# Patient Record
Sex: MALE | Race: WHITE | NOT HISPANIC OR LATINO | Employment: OTHER | URBAN - METROPOLITAN AREA
[De-identification: names, ages, dates, MRNs, and addresses within clinical notes are randomized per-mention and may not be internally consistent; named-entity substitution may affect disease eponyms.]

---

## 2022-05-23 ENCOUNTER — OFFICE VISIT (OUTPATIENT)
Dept: FAMILY MEDICINE | Facility: CLINIC | Age: 42
End: 2022-05-23
Payer: MEDICAID

## 2022-05-23 VITALS
DIASTOLIC BLOOD PRESSURE: 84 MMHG | HEIGHT: 71 IN | BODY MASS INDEX: 21.7 KG/M2 | HEART RATE: 72 BPM | OXYGEN SATURATION: 100 % | WEIGHT: 155 LBS | SYSTOLIC BLOOD PRESSURE: 132 MMHG

## 2022-05-23 DIAGNOSIS — L50.9 URTICARIA: ICD-10-CM

## 2022-05-23 DIAGNOSIS — R10.31 BILATERAL GROIN PAIN: ICD-10-CM

## 2022-05-23 DIAGNOSIS — R10.32 BILATERAL GROIN PAIN: ICD-10-CM

## 2022-05-23 DIAGNOSIS — Z13.31 POSITIVE DEPRESSION SCREENING: ICD-10-CM

## 2022-05-23 DIAGNOSIS — Z00.00 ROUTINE PHYSICAL EXAMINATION: Primary | ICD-10-CM

## 2022-05-23 PROCEDURE — 99204 OFFICE O/P NEW MOD 45 MIN: CPT | Mod: S$GLB,,, | Performed by: PHYSICIAN ASSISTANT

## 2022-05-23 PROCEDURE — 3008F BODY MASS INDEX DOCD: CPT | Mod: CPTII,S$GLB,, | Performed by: PHYSICIAN ASSISTANT

## 2022-05-23 PROCEDURE — 3008F PR BODY MASS INDEX (BMI) DOCUMENTED: ICD-10-PCS | Mod: CPTII,S$GLB,, | Performed by: PHYSICIAN ASSISTANT

## 2022-05-23 PROCEDURE — 1159F MED LIST DOCD IN RCRD: CPT | Mod: CPTII,S$GLB,, | Performed by: PHYSICIAN ASSISTANT

## 2022-05-23 PROCEDURE — 99204 PR OFFICE/OUTPT VISIT, NEW, LEVL IV, 45-59 MIN: ICD-10-PCS | Mod: S$GLB,,, | Performed by: PHYSICIAN ASSISTANT

## 2022-05-23 PROCEDURE — 1159F PR MEDICATION LIST DOCUMENTED IN MEDICAL RECORD: ICD-10-PCS | Mod: CPTII,S$GLB,, | Performed by: PHYSICIAN ASSISTANT

## 2022-05-23 RX ORDER — DIPHENHYDRAMINE HCL 25 MG
25 CAPSULE ORAL EVERY 6 HOURS PRN
COMMUNITY
End: 2022-07-29

## 2022-05-23 NOTE — PROGRESS NOTES
I have used clinical judgement based on duration and functional status to consider definite necessity for treatment.

## 2022-05-23 NOTE — PROGRESS NOTES
SUBJECTIVE:    Patient ID: Josh Rogers is a 42 y.o. male.    Chief Complaint: Establish Care (New patient to establish care//possible bilateral lower side hernias//hair loss//left shoulder pain//patient suspects he has MRSA//tc)    This is a 42-year-old male who presents today to establish care.  His past medical history, past surgical history and family history have been taken and reviewed in the chart with the patient.  I do not see where he has had any recent blood workup.  He has not seen a PCP in quite some time.  Current medications are reflected.  He reports having been on lexapro and trazodone in the past but didn't like the way it made him feel. Saw a physician in [place], [place] but unsure of his name. Has a yard business on the side. Has not been able to hold a job recently. Reports distant hx of learning disability. Was never successful in school. 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 Non-smoker for the past 15 years. Main concern today is in regard to hernias present. Has felt bulging present when lifting and straining. Difficulty handling a weed eater sometimes. Concerns also for MRSA colonization. Has noticed recurrent skin infections. Thinks that he passes on to his wife. Finds more prevalent when he perspires. He is concerned about hair loss. Shaved his head recently and waiting for growth back. Appears in a male pattern type loss. He describes an urticarial ttype rash here recently that may have been brought on by an acute stress reaction. Wife was talking about splitting up. Trying to work through things now.      Admission on 05/12/2022, Discharged on 05/12/2022   Component Date Value Ref Range Status    POC Rapid COVID 05/12/2022 Negative  Negative Final     Acceptable 05/12/2022 Yes   Final    POC Molecular Influenza A Ag 05/12/2022 Negative  Negative, Not Reported Final    POC Molecular Influenza B Ag 05/12/2022 Negative  Negative, Not Reported Final     Acceptable  "05/12/2022 Yes   Final       Past Medical History:   Diagnosis Date    Allergy     Depression      No past surgical history on file.  Family History   Problem Relation Age of Onset    Alcohol abuse Father     Lung cancer Maternal Grandfather        Marital Status:   Alcohol History:  reports previous alcohol use.  Tobacco History:  reports that he has quit smoking. He does not have any smokeless tobacco history on file.  Drug History:  reports previous drug use. Drug: Marijuana.    Review of patient's allergies indicates:   Allergen Reactions    Stress Rash       Current Outpatient Medications:     diphenhydrAMINE (BENADRYL) 25 mg capsule, Take 25 mg by mouth every 6 (six) hours as needed for Itching., Disp: , Rfl:     ibuprofen (ADVIL,MOTRIN) 600 MG tablet, Take 1 tablet (600 mg total) by mouth every 6 (six) hours as needed for Pain. (Patient not taking: Reported on 5/23/2022), Disp: 20 tablet, Rfl: 0    Review of Systems   Constitutional: Negative for activity change, fatigue, fever and unexpected weight change.   HENT: Negative for congestion.    Respiratory: Negative for apnea, cough, chest tightness and shortness of breath.    Cardiovascular: Negative for chest pain and palpitations.   Gastrointestinal: Negative for abdominal distention and abdominal pain.   Genitourinary: Negative for difficulty urinating and dysuria.   Musculoskeletal: Negative for arthralgias and back pain.   Skin: Negative for rash.   Neurological: Negative for dizziness and weakness.          Objective:      Vitals:    05/23/22 1406   BP: 132/84   Pulse: 72   SpO2: 100%   Weight: 70.3 kg (155 lb)   Height: 5' 11" (1.803 m)     Physical Exam  Constitutional:       General: He is not in acute distress.     Appearance: He is well-developed.   HENT:      Head: Normocephalic and atraumatic.   Eyes:      Pupils: Pupils are equal, round, and reactive to light.   Neck:      Thyroid: No thyromegaly.   Cardiovascular:      Rate and " Rhythm: Normal rate and regular rhythm.      Heart sounds: Normal heart sounds.   Pulmonary:      Effort: Pulmonary effort is normal.      Breath sounds: Normal breath sounds.   Abdominal:      General: Bowel sounds are normal. There is no distension.      Palpations: Abdomen is soft.      Tenderness: There is no abdominal tenderness.      Hernia: There is no hernia in the left inguinal area or right inguinal area.   Genitourinary:     Penis: Circumcised.       Testes: Normal.         Right: Mass not present.   Musculoskeletal:         General: Normal range of motion.      Cervical back: Normal range of motion and neck supple.   Skin:     General: Skin is warm and dry.      Findings: No erythema or rash.   Neurological:      Mental Status: He is alert and oriented to person, place, and time.      Cranial Nerves: No cranial nerve deficit.           Assessment:       1. Routine physical examination    2. Positive depression screening    3. Bilateral groin pain    4. Urticaria         Plan:       Routine physical examination  Comments:  Overall, pretty healthy individual. Will get labs for ongoing pt care, f/u in 6-8 weeks.  Orders:  -     CBC Auto Differential; Future; Expected date: 05/23/2022  -     Comprehensive Metabolic Panel; Future; Expected date: 05/23/2022  -     Lipid Panel; Future; Expected date: 05/23/2022  -     TSH w/reflex to FT4; Future; Expected date: 05/23/2022  -     Urinalysis, Reflex to Urine Culture Urine, Clean Catch  -     HIV 1/2 Ag/Ab (4th Gen); Future; Expected date: 05/23/2022  -     Hepatitis C Antibody; Future; Expected date: 05/23/2022    Positive depression screening  Comments:  I have reviewed the positive depression score which warrants active treatment with psychotherapy and/or medications.    Bilateral groin pain  Comments:  unappreciable on exam. will check ultrasound for further evaluation. Possibly MSK in nature  Orders:  -     US Pelvis Limited Non OB; Future; Expected date:  05/23/2022    Urticaria  Comments:  seems like it could be stress related. not present now. discussed having antihistamine on board if he needs. May take BID.      Follow up in about 8 weeks (around 7/18/2022).        5/23/2022 Manuel Cavazos PA-C

## 2022-06-01 ENCOUNTER — HOSPITAL ENCOUNTER (OUTPATIENT)
Dept: RADIOLOGY | Facility: HOSPITAL | Age: 42
Discharge: HOME | End: 2022-06-01
Attending: PHYSICIAN ASSISTANT
Payer: MEDICAID

## 2022-06-01 DIAGNOSIS — R10.32 BILATERAL GROIN PAIN: ICD-10-CM

## 2022-06-01 DIAGNOSIS — R10.31 BILATERAL GROIN PAIN: ICD-10-CM

## 2022-06-01 LAB
ALBUMIN SERPL-MCNC: 4.8 G/DL (ref 3.6–5.1)
ALBUMIN/GLOB SERPL: 2 (CALC) (ref 1–2.5)
ALP SERPL-CCNC: 72 U/L (ref 36–130)
ALT SERPL-CCNC: 17 U/L (ref 9–46)
AST SERPL-CCNC: 15 U/L (ref 10–40)
BASOPHILS # BLD AUTO: 39 CELLS/UL (ref 0–200)
BASOPHILS NFR BLD AUTO: 0.8 %
BILIRUB SERPL-MCNC: 0.5 MG/DL (ref 0.2–1.2)
BUN SERPL-MCNC: 21 MG/DL (ref 7–25)
BUN/CREAT SERPL: NORMAL (CALC) (ref 6–22)
CALCIUM SERPL-MCNC: 10.3 MG/DL (ref 8.6–10.3)
CHLORIDE SERPL-SCNC: 105 MMOL/L (ref 98–110)
CHOLEST SERPL-MCNC: 173 MG/DL
CHOLEST/HDLC SERPL: 4.2 (CALC)
CO2 SERPL-SCNC: 30 MMOL/L (ref 20–32)
CREAT SERPL-MCNC: 1.09 MG/DL (ref 0.6–1.35)
EOSINOPHIL # BLD AUTO: 240 CELLS/UL (ref 15–500)
EOSINOPHIL NFR BLD AUTO: 4.9 %
ERYTHROCYTE [DISTWIDTH] IN BLOOD BY AUTOMATED COUNT: 12.2 % (ref 11–15)
GLOBULIN SER CALC-MCNC: 2.4 G/DL (CALC) (ref 1.9–3.7)
GLUCOSE SERPL-MCNC: 96 MG/DL (ref 65–99)
HCT VFR BLD AUTO: 45.8 % (ref 38.5–50)
HCV AB S/CO SERPL IA: 0.02
HCV AB SERPL QL IA: NORMAL
HDLC SERPL-MCNC: 41 MG/DL
HGB BLD-MCNC: 15.5 G/DL (ref 13.2–17.1)
HIV 1+2 AB+HIV1 P24 AG SERPL QL IA: NORMAL
LDLC SERPL CALC-MCNC: 107 MG/DL (CALC)
LYMPHOCYTES # BLD AUTO: 1872 CELLS/UL (ref 850–3900)
LYMPHOCYTES NFR BLD AUTO: 38.2 %
MCH RBC QN AUTO: 30.3 PG (ref 27–33)
MCHC RBC AUTO-ENTMCNC: 33.8 G/DL (ref 32–36)
MCV RBC AUTO: 89.6 FL (ref 80–100)
MONOCYTES # BLD AUTO: 348 CELLS/UL (ref 200–950)
MONOCYTES NFR BLD AUTO: 7.1 %
NEUTROPHILS # BLD AUTO: 2401 CELLS/UL (ref 1500–7800)
NEUTROPHILS NFR BLD AUTO: 49 %
NONHDLC SERPL-MCNC: 132 MG/DL (CALC)
PLATELET # BLD AUTO: 313 THOUSAND/UL (ref 140–400)
PMV BLD REES-ECKER: 10.8 FL (ref 7.5–12.5)
POTASSIUM SERPL-SCNC: 4.5 MMOL/L (ref 3.5–5.3)
PROT SERPL-MCNC: 7.2 G/DL (ref 6.1–8.1)
RBC # BLD AUTO: 5.11 MILLION/UL (ref 4.2–5.8)
SODIUM SERPL-SCNC: 142 MMOL/L (ref 135–146)
TRIGL SERPL-MCNC: 139 MG/DL
TSH SERPL-ACNC: 1.06 MIU/L (ref 0.4–4.5)
WBC # BLD AUTO: 4.9 THOUSAND/UL (ref 3.8–10.8)

## 2022-06-01 PROCEDURE — 76857 US EXAM PELVIC LIMITED: CPT | Mod: TC,PO

## 2022-06-03 ENCOUNTER — TELEPHONE (OUTPATIENT)
Dept: FAMILY MEDICINE | Facility: CLINIC | Age: 42
End: 2022-06-03

## 2022-06-03 NOTE — TELEPHONE ENCOUNTER
----- Message from Manuel Cavazos PA-C sent at 6/2/2022  5:08 PM CDT -----  No acute abnormality seen in the groin area on ultrasound.  Follow-up as scheduled at the end of July

## 2022-06-06 ENCOUNTER — TELEPHONE (OUTPATIENT)
Dept: FAMILY MEDICINE | Facility: CLINIC | Age: 42
End: 2022-06-06

## 2022-06-06 NOTE — TELEPHONE ENCOUNTER
----- Message from Manuel Cavazos PA-C sent at 6/6/2022  4:43 PM CDT -----  All labs appear stable at this time. Continue as is.

## 2022-06-21 ENCOUNTER — TELEPHONE (OUTPATIENT)
Dept: FAMILY MEDICINE | Facility: CLINIC | Age: 42
End: 2022-06-21

## 2022-07-29 ENCOUNTER — OFFICE VISIT (OUTPATIENT)
Dept: FAMILY MEDICINE | Facility: CLINIC | Age: 42
End: 2022-07-29
Payer: MEDICAID

## 2022-07-29 VITALS
HEART RATE: 80 BPM | DIASTOLIC BLOOD PRESSURE: 86 MMHG | HEIGHT: 71 IN | OXYGEN SATURATION: 99 % | SYSTOLIC BLOOD PRESSURE: 124 MMHG | WEIGHT: 153.63 LBS | BODY MASS INDEX: 21.51 KG/M2

## 2022-07-29 DIAGNOSIS — R07.9 CHEST PAIN, UNSPECIFIED TYPE: ICD-10-CM

## 2022-07-29 DIAGNOSIS — F41.9 ANXIETY AND DEPRESSION: Primary | ICD-10-CM

## 2022-07-29 DIAGNOSIS — N50.89 GENITAL LESION, MALE: ICD-10-CM

## 2022-07-29 DIAGNOSIS — F32.A ANXIETY AND DEPRESSION: Primary | ICD-10-CM

## 2022-07-29 LAB
EKG 12-LEAD: NORMAL
PR INTERVAL: NORMAL
PRT AXES: NORMAL
QRS DURATION: NORMAL
QT/QTC: NORMAL
VENTRICULAR RATE: NORMAL

## 2022-07-29 PROCEDURE — 3079F PR MOST RECENT DIASTOLIC BLOOD PRESSURE 80-89 MM HG: ICD-10-PCS | Mod: CPTII,S$GLB,, | Performed by: PHYSICIAN ASSISTANT

## 2022-07-29 PROCEDURE — 3008F PR BODY MASS INDEX (BMI) DOCUMENTED: ICD-10-PCS | Mod: CPTII,S$GLB,, | Performed by: PHYSICIAN ASSISTANT

## 2022-07-29 PROCEDURE — 3079F DIAST BP 80-89 MM HG: CPT | Mod: CPTII,S$GLB,, | Performed by: PHYSICIAN ASSISTANT

## 2022-07-29 PROCEDURE — 1159F PR MEDICATION LIST DOCUMENTED IN MEDICAL RECORD: ICD-10-PCS | Mod: CPTII,S$GLB,, | Performed by: PHYSICIAN ASSISTANT

## 2022-07-29 PROCEDURE — 99214 OFFICE O/P EST MOD 30 MIN: CPT | Mod: 25,S$GLB,, | Performed by: PHYSICIAN ASSISTANT

## 2022-07-29 PROCEDURE — 3074F SYST BP LT 130 MM HG: CPT | Mod: CPTII,S$GLB,, | Performed by: PHYSICIAN ASSISTANT

## 2022-07-29 PROCEDURE — 93000 ELECTROCARDIOGRAM COMPLETE: CPT | Mod: S$GLB,,, | Performed by: PHYSICIAN ASSISTANT

## 2022-07-29 PROCEDURE — 99214 PR OFFICE/OUTPT VISIT, EST, LEVL IV, 30-39 MIN: ICD-10-PCS | Mod: 25,S$GLB,, | Performed by: PHYSICIAN ASSISTANT

## 2022-07-29 PROCEDURE — 93000 POCT EKG 12-LEAD: ICD-10-PCS | Mod: S$GLB,,, | Performed by: PHYSICIAN ASSISTANT

## 2022-07-29 PROCEDURE — 1159F MED LIST DOCD IN RCRD: CPT | Mod: CPTII,S$GLB,, | Performed by: PHYSICIAN ASSISTANT

## 2022-07-29 PROCEDURE — 3008F BODY MASS INDEX DOCD: CPT | Mod: CPTII,S$GLB,, | Performed by: PHYSICIAN ASSISTANT

## 2022-07-29 PROCEDURE — 3074F PR MOST RECENT SYSTOLIC BLOOD PRESSURE < 130 MM HG: ICD-10-PCS | Mod: CPTII,S$GLB,, | Performed by: PHYSICIAN ASSISTANT

## 2022-07-29 RX ORDER — ESCITALOPRAM OXALATE 10 MG/1
10 TABLET ORAL DAILY
Qty: 30 TABLET | Refills: 5 | Status: SHIPPED | OUTPATIENT
Start: 2022-07-29 | End: 2023-03-22 | Stop reason: SDUPTHER

## 2022-07-29 RX ORDER — MUPIROCIN 20 MG/G
OINTMENT TOPICAL 3 TIMES DAILY
Qty: 30 G | Refills: 0 | Status: SHIPPED | OUTPATIENT
Start: 2022-07-29 | End: 2022-08-28

## 2022-07-29 NOTE — PROGRESS NOTES
SUBJECTIVE:    Patient ID: Josh Rogers is a 42 y.o. male.    Chief Complaint: Follow-up (No bottles/ had blood work and u/s done/ lc)    This is a 42-year-old male who presents today for follow-up after having initial blood work done and obtaining pelvic ultrasound to evaluate pain and discomfort.  All studies are within normal limits.  Blood work is excellent.  Blood counts, kidneys, liver, thyroid and cholesterol are all at goal.  Screenings for HIV and hep C are negative.  Patient reports that he has started having more consistent left sided chest pains and SOB. Reports that comes on when something is difficult or stress inducing for him. Not associated with exertion or exercise. Usually comes on when he is thinking about something laying in the bed. Has been treated in the past with effexor that he thinks made his mouth dry. This was the only thing that he ever tried for anxiety.  He also brings up continued genital lesions.  None present today but pictures are consistent with folliculitis versus possible herpetic lesions.  Difficult to ascertain based upon pictures and pubic hair partially covering in the pictures.  He reports lesions do drain and are painful.  Does wish to see Dermatology.  Open to checking herpes blood work.  He is monogamous with his wife of 15 years.      Office Visit on 05/23/2022   Component Date Value Ref Range Status    WBC 05/31/2022 4.9  3.8 - 10.8 Thousand/uL Final    RBC 05/31/2022 5.11  4.20 - 5.80 Million/uL Final    Hemoglobin 05/31/2022 15.5  13.2 - 17.1 g/dL Final    Hematocrit 05/31/2022 45.8  38.5 - 50.0 % Final    MCV 05/31/2022 89.6  80.0 - 100.0 fL Final    MCH 05/31/2022 30.3  27.0 - 33.0 pg Final    MCHC 05/31/2022 33.8  32.0 - 36.0 g/dL Final    RDW 05/31/2022 12.2  11.0 - 15.0 % Final    Platelets 05/31/2022 313  140 - 400 Thousand/uL Final    MPV 05/31/2022 10.8  7.5 - 12.5 fL Final    Neutrophils, Abs 05/31/2022 2,401  1,500 - 7,800 cells/uL Final     Lymph # 05/31/2022 1,872  850 - 3,900 cells/uL Final    Mono # 05/31/2022 348  200 - 950 cells/uL Final    Eos # 05/31/2022 240  15 - 500 cells/uL Final    Baso # 05/31/2022 39  0 - 200 cells/uL Final    Neutrophils Relative 05/31/2022 49  % Final    Lymph % 05/31/2022 38.2  % Final    Mono % 05/31/2022 7.1  % Final    Eosinophil % 05/31/2022 4.9  % Final    Basophil % 05/31/2022 0.8  % Final    Glucose 05/31/2022 96  65 - 99 mg/dL Final    BUN 05/31/2022 21  7 - 25 mg/dL Final    Creatinine 05/31/2022 1.09  0.60 - 1.35 mg/dL Final    eGFR if non African American 05/31/2022 84  > OR = 60 mL/min/1.73m2 Final    eGFR if  05/31/2022 97  > OR = 60 mL/min/1.73m2 Final    BUN/Creatinine Ratio 05/31/2022 NOT APPLICABLE  6 - 22 (calc) Final    Sodium 05/31/2022 142  135 - 146 mmol/L Final    Potassium 05/31/2022 4.5  3.5 - 5.3 mmol/L Final    Chloride 05/31/2022 105  98 - 110 mmol/L Final    CO2 05/31/2022 30  20 - 32 mmol/L Final    Calcium 05/31/2022 10.3  8.6 - 10.3 mg/dL Final    Total Protein 05/31/2022 7.2  6.1 - 8.1 g/dL Final    Albumin 05/31/2022 4.8  3.6 - 5.1 g/dL Final    Globulin, Total 05/31/2022 2.4  1.9 - 3.7 g/dL (calc) Final    Albumin/Globulin Ratio 05/31/2022 2.0  1.0 - 2.5 (calc) Final    Total Bilirubin 05/31/2022 0.5  0.2 - 1.2 mg/dL Final    Alkaline Phosphatase 05/31/2022 72  36 - 130 U/L Final    AST 05/31/2022 15  10 - 40 U/L Final    ALT 05/31/2022 17  9 - 46 U/L Final    Cholesterol 05/31/2022 173  <200 mg/dL Final    HDL 05/31/2022 41  > OR = 40 mg/dL Final    Triglycerides 05/31/2022 139  <150 mg/dL Final    LDL Cholesterol 05/31/2022 107 (A) mg/dL (calc) Final    HDL/Cholesterol Ratio 05/31/2022 4.2  <5.0 (calc) Final    Non HDL Chol. (LDL+VLDL) 05/31/2022 132 (A) <130 mg/dL (calc) Final    TSH w/reflex to FT4 05/31/2022 1.06  0.40 - 4.50 mIU/L Final    HIV Ag/Ab 4th Gen 05/31/2022 NON-REACTIVE  NON-REACTIVE Final    Hepatitis C Ab  05/31/2022 NON-REACTIVE  NON-REACTIVE Final    Signal/Cutoff 05/31/2022 0.02  <1.00 Final   Admission on 05/12/2022, Discharged on 05/12/2022   Component Date Value Ref Range Status    POC Rapid COVID 05/12/2022 Negative  Negative Final     Acceptable 05/12/2022 Yes   Final    POC Molecular Influenza A Ag 05/12/2022 Negative  Negative, Not Reported Final    POC Molecular Influenza B Ag 05/12/2022 Negative  Negative, Not Reported Final     Acceptable 05/12/2022 Yes   Final       Past Medical History:   Diagnosis Date    Allergy     Depression      History reviewed. No pertinent surgical history.  Family History   Problem Relation Age of Onset    Alcohol abuse Father     Lung cancer Maternal Grandfather        Marital Status:   Alcohol History:  reports previous alcohol use.  Tobacco History:  reports that he has quit smoking. He does not have any smokeless tobacco history on file.  Drug History:  reports previous drug use. Drug: Marijuana.    Review of patient's allergies indicates:   Allergen Reactions    Stress Rash       Current Outpatient Medications:     EScitalopram oxalate (LEXAPRO) 10 MG tablet, Take 1 tablet (10 mg total) by mouth once daily., Disp: 30 tablet, Rfl: 5    mupirocin (BACTROBAN) 2 % ointment, Apply topically 3 (three) times daily., Disp: 30 g, Rfl: 0    Review of Systems   Constitutional: Negative for activity change, fatigue, fever and unexpected weight change.   HENT: Negative for congestion.    Respiratory: Negative for apnea, cough, chest tightness and shortness of breath.    Cardiovascular: Negative for chest pain and palpitations.   Gastrointestinal: Negative for abdominal distention and abdominal pain.   Genitourinary: Negative for difficulty urinating and dysuria.   Musculoskeletal: Negative for arthralgias and back pain.   Neurological: Negative for dizziness and weakness.   Psychiatric/Behavioral: Positive for decreased concentration  "and sleep disturbance. The patient is nervous/anxious.           Objective:      Vitals:    07/29/22 0926   BP: 124/86   Pulse: 80   SpO2: 99%   Weight: 69.7 kg (153 lb 9.6 oz)   Height: 5' 11" (1.803 m)     Physical Exam  Constitutional:       General: He is not in acute distress.     Appearance: He is well-developed.   HENT:      Head: Normocephalic and atraumatic.   Eyes:      Pupils: Pupils are equal, round, and reactive to light.   Neck:      Thyroid: No thyromegaly.   Cardiovascular:      Rate and Rhythm: Normal rate and regular rhythm.      Heart sounds: Normal heart sounds.   Pulmonary:      Effort: Pulmonary effort is normal.      Breath sounds: Normal breath sounds.   Abdominal:      General: Bowel sounds are normal. There is no distension.      Palpations: Abdomen is soft.      Tenderness: There is no abdominal tenderness.   Musculoskeletal:         General: Normal range of motion.      Cervical back: Normal range of motion and neck supple.   Skin:     General: Skin is warm and dry.      Findings: No erythema or rash.   Neurological:      Mental Status: He is alert and oriented to person, place, and time.      Cranial Nerves: No cranial nerve deficit.           Assessment:       1. Anxiety and depression    2. Genital lesion, male    3. Chest pain, unspecified type         Plan:       Anxiety and depression  Comments:  EKG completely within normal limits.  Patient open to starting SSRI at this time.  Will plan to follow-up in 8 weeks  Orders:  -     EScitalopram oxalate (LEXAPRO) 10 MG tablet; Take 1 tablet (10 mg total) by mouth once daily.  Dispense: 30 tablet; Refill: 5    Genital lesion, male  Comments:  Herpetic versus folliculitis in nature.  Will check blood work for herpes.  Treat accordingly.  Given mupirocin ointment for possible folliculitis  Orders:  -     mupirocin (BACTROBAN) 2 % ointment; Apply topically 3 (three) times daily.  Dispense: 30 g; Refill: 0  -     Herpes Simplex Virus (HSV) 1 & " 2 (IgM) and Type-Specific (IgG), SUNNY; Future; Expected date: 07/29/2022    Chest pain, unspecified type  Comments:  Suspect chest pain related to anxiety and panic.  Patient open to SSRI.  EKG sinus bradycardia and within normal limits otherwise.  Orders:  -     POCT EKG 12-LEAD (NOT FOR [facility] USE)      Follow up in about 8 weeks (around 9/23/2022) for followup medication.        7/29/2022 Manuel Cavazos PA-C

## 2022-08-05 LAB
HSV1 IGG SER IA-ACNC: <0.9 INDEX
HSV1 IGM SER QL IF: NEGATIVE
HSV2 IGG SER IA-ACNC: 13 INDEX
HSV2 IGM SER QL IF: NEGATIVE

## 2023-03-22 DIAGNOSIS — F32.A ANXIETY AND DEPRESSION: ICD-10-CM

## 2023-03-22 DIAGNOSIS — F41.9 ANXIETY AND DEPRESSION: ICD-10-CM

## 2023-03-22 RX ORDER — ESCITALOPRAM OXALATE 10 MG/1
10 TABLET ORAL DAILY
Qty: 30 TABLET | Refills: 2 | Status: SHIPPED | OUTPATIENT
Start: 2023-03-22 | End: 2023-08-04

## 2023-03-22 NOTE — TELEPHONE ENCOUNTER
----- Message from Elizabeth Chi MA sent at 3/22/2023 12:51 PM CDT -----  Regarding: refill  Pt needs lexapro sent to [facility] on [address]. Pt also needs an appt in 2 weeks if possible. [phone]

## 2023-03-22 NOTE — TELEPHONE ENCOUNTER
Spoke to pt. He has been scheduled for appt. Advised pt of no show policy. Advised he needs to come to the upcoming appt to ensure refills. Pt voiced understanding.

## 2023-06-12 ENCOUNTER — TELEPHONE (OUTPATIENT)
Dept: FAMILY MEDICINE | Facility: CLINIC | Age: 43
End: 2023-06-12

## 2023-06-12 NOTE — TELEPHONE ENCOUNTER
----- Message from Elizabeth Mackey MA sent at 6/12/2023  2:19 PM CDT -----  Regarding: appt reschedule.  Pt calling to reschedule his appt he missed this morning and states that he replied to the text message but wasn't sure it was received.

## 2023-06-19 ENCOUNTER — TELEPHONE (OUTPATIENT)
Dept: FAMILY MEDICINE | Facility: CLINIC | Age: 43
End: 2023-06-19

## 2023-06-19 NOTE — TELEPHONE ENCOUNTER
----- Message from Elizabeth Mackey MA sent at 6/19/2023 10:00 AM CDT -----  Regarding: referral  Pt calling for a referral to a dentist that takes his insurance.

## 2023-08-04 ENCOUNTER — OFFICE VISIT (OUTPATIENT)
Dept: FAMILY MEDICINE | Facility: CLINIC | Age: 43
End: 2023-08-04
Payer: MEDICAID

## 2023-08-04 VITALS
WEIGHT: 162 LBS | HEART RATE: 72 BPM | DIASTOLIC BLOOD PRESSURE: 84 MMHG | HEIGHT: 71 IN | BODY MASS INDEX: 22.68 KG/M2 | SYSTOLIC BLOOD PRESSURE: 132 MMHG

## 2023-08-04 DIAGNOSIS — F41.9 ANXIETY AND DEPRESSION: ICD-10-CM

## 2023-08-04 DIAGNOSIS — F32.A ANXIETY AND DEPRESSION: ICD-10-CM

## 2023-08-04 DIAGNOSIS — Z00.00 ROUTINE PHYSICAL EXAMINATION: Primary | ICD-10-CM

## 2023-08-04 PROCEDURE — 3008F BODY MASS INDEX DOCD: CPT | Mod: CPTII,S$GLB,, | Performed by: PHYSICIAN ASSISTANT

## 2023-08-04 PROCEDURE — 99396 PR PREVENTIVE VISIT,EST,40-64: ICD-10-PCS | Mod: S$GLB,,, | Performed by: PHYSICIAN ASSISTANT

## 2023-08-04 PROCEDURE — 1159F PR MEDICATION LIST DOCUMENTED IN MEDICAL RECORD: ICD-10-PCS | Mod: CPTII,S$GLB,, | Performed by: PHYSICIAN ASSISTANT

## 2023-08-04 PROCEDURE — 3075F SYST BP GE 130 - 139MM HG: CPT | Mod: CPTII,S$GLB,, | Performed by: PHYSICIAN ASSISTANT

## 2023-08-04 PROCEDURE — 99396 PREV VISIT EST AGE 40-64: CPT | Mod: S$GLB,,, | Performed by: PHYSICIAN ASSISTANT

## 2023-08-04 PROCEDURE — 3008F PR BODY MASS INDEX (BMI) DOCUMENTED: ICD-10-PCS | Mod: CPTII,S$GLB,, | Performed by: PHYSICIAN ASSISTANT

## 2023-08-04 PROCEDURE — 3079F PR MOST RECENT DIASTOLIC BLOOD PRESSURE 80-89 MM HG: ICD-10-PCS | Mod: CPTII,S$GLB,, | Performed by: PHYSICIAN ASSISTANT

## 2023-08-04 PROCEDURE — 3079F DIAST BP 80-89 MM HG: CPT | Mod: CPTII,S$GLB,, | Performed by: PHYSICIAN ASSISTANT

## 2023-08-04 PROCEDURE — 1159F MED LIST DOCD IN RCRD: CPT | Mod: CPTII,S$GLB,, | Performed by: PHYSICIAN ASSISTANT

## 2023-08-04 PROCEDURE — 3075F PR MOST RECENT SYSTOLIC BLOOD PRESS GE 130-139MM HG: ICD-10-PCS | Mod: CPTII,S$GLB,, | Performed by: PHYSICIAN ASSISTANT

## 2023-08-04 NOTE — PROGRESS NOTES
SUBJECTIVE:    Patient ID: Josh Rogers is a 43 y.o. male.    Chief Complaint: Follow-up (Takes no rx meds// SW)    This is a 43-year-old male who presents today for regular follow-up.  Previously healthy individual not treated for any chronic conditions.  Just allergies and history of depression/anxiety.  Patient reports he has not had any further irrregular heart beat or rash. Really doing well. No new concerns.         No visits with results within 6 Month(s) from this visit.   Latest known visit with results is:   Office Visit on 07/29/2022   Component Date Value Ref Range Status    HSV 1 IgG 07/29/2022 <0.90  index Final    HSV 2 IgG 07/29/2022 13.00 (H)  index Final    HSV 1 IgM 07/29/2022 NEGATIVE   Final    HSV 2 IgM 07/29/2022 NEGATIVE   Final       Past Medical History:   Diagnosis Date    Allergy     Depression      History reviewed. No pertinent surgical history.  Family History   Problem Relation Age of Onset    Alcohol abuse Father     Lung cancer Maternal Grandfather        Marital Status:   Alcohol History:  reports that he does not currently use alcohol.  Tobacco History:  reports that he has quit smoking. He does not have any smokeless tobacco history on file.  Drug History:  reports that he does not currently use drugs after having used the following drugs: Marijuana.    Review of patient's allergies indicates:   Allergen Reactions    Stress Rash     No current outpatient medications on file.    Review of Systems   Constitutional:  Negative for activity change, fatigue, fever and unexpected weight change.   HENT:  Negative for congestion.    Respiratory:  Negative for apnea, cough, chest tightness and shortness of breath.    Cardiovascular:  Negative for chest pain and palpitations.   Gastrointestinal:  Negative for abdominal distention and abdominal pain.   Genitourinary:  Negative for difficulty urinating and dysuria.   Musculoskeletal:  Negative for arthralgias and back pain.  "  Neurological:  Negative for dizziness and weakness.          Objective:      Vitals:    08/04/23 1007   BP: 132/84   Pulse: 72   Weight: 73.5 kg (162 lb)   Height: 5' 11" (1.803 m)     Physical Exam  Constitutional:       General: He is not in acute distress.     Appearance: He is well-developed.   HENT:      Head: Normocephalic and atraumatic.   Eyes:      Pupils: Pupils are equal, round, and reactive to light.   Neck:      Thyroid: No thyromegaly.   Cardiovascular:      Rate and Rhythm: Normal rate and regular rhythm.      Heart sounds: Normal heart sounds.   Pulmonary:      Effort: Pulmonary effort is normal.      Breath sounds: Normal breath sounds.   Abdominal:      General: Bowel sounds are normal. There is no distension.      Palpations: Abdomen is soft.      Tenderness: There is no abdominal tenderness.   Musculoskeletal:         General: Normal range of motion.      Cervical back: Normal range of motion and neck supple.   Skin:     General: Skin is warm and dry.      Findings: No erythema or rash.   Neurological:      Mental Status: He is alert and oriented to person, place, and time.      Cranial Nerves: No cranial nerve deficit.           Assessment:       1. Routine physical examination    2. Anxiety and depression         Plan:       Routine physical examination  Comments:  healthy individual. will check annual labs now for ongoing pt care.  Orders:  -     CBC Auto Differential; Future; Expected date: 08/04/2023  -     Comprehensive Metabolic Panel; Future; Expected date: 08/04/2023  -     Lipid Panel; Future; Expected date: 08/04/2023  -     TSH w/reflex to FT4; Future; Expected date: 08/04/2023    Anxiety and depression  Comments:  Had stopped lexapro and managing just fine now. will maintain off medication.  Orders:  -     CBC Auto Differential; Future; Expected date: 08/04/2023  -     Comprehensive Metabolic Panel; Future; Expected date: 08/04/2023  -     Lipid Panel; Future; Expected date: " 08/04/2023  -     TSH w/reflex to FT4; Future; Expected date: 08/04/2023      Follow up in about 1 year (around 8/4/2024) for Annual Physical.        8/4/2023 Manuel Cavazos PA-C